# Patient Record
Sex: MALE | Race: BLACK OR AFRICAN AMERICAN
[De-identification: names, ages, dates, MRNs, and addresses within clinical notes are randomized per-mention and may not be internally consistent; named-entity substitution may affect disease eponyms.]

---
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
## 2020-09-24 ENCOUNTER — HOSPITAL ENCOUNTER (EMERGENCY)
Dept: HOSPITAL 87 - ER | Age: 20
Discharge: HOME | End: 2020-09-24
Payer: MEDICAID

## 2020-09-24 VITALS — SYSTOLIC BLOOD PRESSURE: 138 MMHG | DIASTOLIC BLOOD PRESSURE: 67 MMHG

## 2020-09-24 VITALS — HEIGHT: 77 IN | WEIGHT: 220.46 LBS | BODY MASS INDEX: 26.03 KG/M2
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
## 2020-09-24 DIAGNOSIS — S62.325A: Primary | ICD-10-CM
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
## 2020-09-24 DIAGNOSIS — S62.337A: ICD-10-CM

## 2020-09-24 PROCEDURE — 29125 APPL SHORT ARM SPLINT STATIC: CPT

## 2020-09-24 PROCEDURE — 73130 X-RAY EXAM OF HAND: CPT

## 2020-09-24 PROCEDURE — 99283 EMERGENCY DEPT VISIT LOW MDM: CPT

## 2021-08-03 ENCOUNTER — HOSPITAL ENCOUNTER (EMERGENCY)
Dept: HOSPITAL 87 - ER | Age: 21
LOS: 1 days | Discharge: HOME | End: 2021-08-04
Payer: COMMERCIAL

## 2021-08-03 VITALS — SYSTOLIC BLOOD PRESSURE: 121 MMHG | DIASTOLIC BLOOD PRESSURE: 66 MMHG

## 2021-08-03 VITALS — HEIGHT: 77 IN | WEIGHT: 222.67 LBS | BODY MASS INDEX: 26.29 KG/M2
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
## 2021-08-03 DIAGNOSIS — R51.9: ICD-10-CM
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
## 2021-08-03 DIAGNOSIS — M54.5: Primary | ICD-10-CM

## 2021-08-03 PROCEDURE — 96372 THER/PROPH/DIAG INJ SC/IM: CPT

## 2021-08-03 PROCEDURE — 99283 EMERGENCY DEPT VISIT LOW MDM: CPT

## 2022-07-08 ENCOUNTER — HOSPITAL ENCOUNTER (EMERGENCY)
Dept: HOSPITAL 87 - ER | Age: 22
Discharge: HOME | End: 2022-07-08
Payer: MEDICAID

## 2022-07-08 VITALS — BODY MASS INDEX: 27.33 KG/M2 | WEIGHT: 231.49 LBS | HEIGHT: 77 IN

## 2022-07-08 VITALS — SYSTOLIC BLOOD PRESSURE: 112 MMHG | DIASTOLIC BLOOD PRESSURE: 64 MMHG
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
## 2022-07-08 DIAGNOSIS — T18.9XXA: Primary | ICD-10-CM
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
## 2022-07-08 PROCEDURE — 70360 X-RAY EXAM OF NECK: CPT

## 2022-07-08 PROCEDURE — 71045 X-RAY EXAM CHEST 1 VIEW: CPT

## 2022-07-08 PROCEDURE — 99284 EMERGENCY DEPT VISIT MOD MDM: CPT

## 2024-05-26 ENCOUNTER — HOSPITAL ENCOUNTER (EMERGENCY)
Dept: HOSPITAL 87 - ER | Age: 24
Discharge: HOME | End: 2024-05-26
Payer: MEDICAID

## 2024-05-26 VITALS
SYSTOLIC BLOOD PRESSURE: 114 MMHG | OXYGEN SATURATION: 100 % | RESPIRATION RATE: 16 BRPM | TEMPERATURE: 98.3 F | HEART RATE: 89 BPM | DIASTOLIC BLOOD PRESSURE: 59 MMHG

## 2024-05-26 VITALS — WEIGHT: 194.01 LBS | BODY MASS INDEX: 22.91 KG/M2 | HEIGHT: 77 IN

## 2024-05-26 DIAGNOSIS — Z88.6: ICD-10-CM
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
## 2024-05-26 DIAGNOSIS — S93.491A: Primary | ICD-10-CM
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
## 2024-05-26 PROCEDURE — 99283 EMERGENCY DEPT VISIT LOW MDM: CPT

## 2024-05-26 PROCEDURE — 73610 X-RAY EXAM OF ANKLE: CPT
